# Patient Record
Sex: MALE | Race: WHITE | NOT HISPANIC OR LATINO | Employment: FULL TIME | URBAN - METROPOLITAN AREA
[De-identification: names, ages, dates, MRNs, and addresses within clinical notes are randomized per-mention and may not be internally consistent; named-entity substitution may affect disease eponyms.]

---

## 2023-10-29 ENCOUNTER — OFFICE VISIT (OUTPATIENT)
Dept: URGENT CARE | Facility: URGENT CARE | Age: 28
End: 2023-10-29
Payer: COMMERCIAL

## 2023-10-29 VITALS
DIASTOLIC BLOOD PRESSURE: 74 MMHG | HEART RATE: 72 BPM | TEMPERATURE: 98.1 F | SYSTOLIC BLOOD PRESSURE: 134 MMHG | OXYGEN SATURATION: 99 % | WEIGHT: 150 LBS

## 2023-10-29 DIAGNOSIS — J34.89 SINUS PAIN: Primary | ICD-10-CM

## 2023-10-29 DIAGNOSIS — K08.89 PAIN IN TOOTH: ICD-10-CM

## 2023-10-29 PROCEDURE — 99203 OFFICE O/P NEW LOW 30 MIN: CPT | Performed by: INTERNAL MEDICINE

## 2023-10-29 RX ORDER — FLUTICASONE PROPIONATE 50 MCG
1 SPRAY, SUSPENSION (ML) NASAL 2 TIMES DAILY
Qty: 16 G | Refills: 0 | Status: SHIPPED | OUTPATIENT
Start: 2023-10-29 | End: 2023-11-08

## 2023-10-29 NOTE — PROGRESS NOTES
ASSESSMENT AND PLAN:      ICD-10-CM    1. Sinus pain  J34.89 amoxicillin-clavulanate (AUGMENTIN) 875-125 MG tablet     fluticasone (FLONASE) 50 MCG/ACT nasal spray      2. Pain in tooth  K08.89 amoxicillin-clavulanate (AUGMENTIN) 875-125 MG tablet        Patient Instructions       Sinus infection & tooth infection    Ice  Heat.  Augmentin  antibiotics 2 x day for 10 days  Probiotics /yogurt    Flonase 2 x day for 10 days  No follow-ups on file.        Becky Ma MD  [facility]    Subjective     Mikel Camejo is a 28 year old who presents for Patient presents with:  Urgent Care  Facial Pain: C/O facial pain for 3 day    a new patient of Counts include 234 beds at the [facility].        Onset of symptoms was 3 day(s) ago.  Course of illness is worsening.    right Sinus & tooth pain started on plane flight  Not a lot of mucous    Seen by dentist few weeks ago & scheduled for fillings of cavities in same area  Not worse with eating  Worse with blowing nose    Flying again next Friday  Took first allergy shots - but day prior to pain    Treatment measures tried include: saline, sudafed, tylenol    Significant past medical history: history of allergies to dust mites, etc.    Had asymptomatic but sinus infection of mri in past.      Review of Systems        Objective    /74   Pulse 72   Temp 98.1  F (36.7  C) (Tympanic)   Wt 68 kg (150 lb)   SpO2 99%   Physical Exam  Vitals reviewed.   Constitutional:       Appearance: Normal appearance.   HENT:      Right Ear: Tympanic membrane normal.      Left Ear: Tympanic membrane normal.      Mouth/Throat:      Mouth: Mucous membranes are moist.      Pharynx: Oropharynx is clear.      Comments: No obvious cavities    Pain pinpoints at R mid jaw at base of sinuses  Neurological:      Mental Status: He is alert.

## 2023-10-29 NOTE — PATIENT INSTRUCTIONS
Sinus infection & tooth infection    Ice  Heat.  Augmentin  antibiotics 2 x day for 10 days  Probiotics /yogurt    Flonase 2 x day for 10 days

## 2023-12-17 ENCOUNTER — HEALTH MAINTENANCE LETTER (OUTPATIENT)
Age: 28
End: 2023-12-17

## 2024-02-10 ENCOUNTER — OFFICE VISIT (OUTPATIENT)
Dept: URGENT CARE | Facility: URGENT CARE | Age: 29
End: 2024-02-10
Payer: COMMERCIAL

## 2024-02-10 VITALS
DIASTOLIC BLOOD PRESSURE: 77 MMHG | OXYGEN SATURATION: 98 % | BODY MASS INDEX: 21.62 KG/M2 | SYSTOLIC BLOOD PRESSURE: 130 MMHG | TEMPERATURE: 98 F | HEART RATE: 73 BPM | WEIGHT: 146 LBS | RESPIRATION RATE: 16 BRPM | HEIGHT: 69 IN

## 2024-02-10 DIAGNOSIS — G47.00 INSOMNIA, UNSPECIFIED TYPE: ICD-10-CM

## 2024-02-10 DIAGNOSIS — F43.0 ACUTE REACTION TO STRESS: Primary | ICD-10-CM

## 2024-02-10 PROCEDURE — 99215 OFFICE O/P EST HI 40 MIN: CPT | Performed by: PHYSICIAN ASSISTANT

## 2024-02-10 RX ORDER — HYDROXYZINE HYDROCHLORIDE 25 MG/1
50 TABLET, FILM COATED ORAL EVERY 6 HOURS PRN
Qty: 30 TABLET | Refills: 0 | Status: SHIPPED | OUTPATIENT
Start: 2024-02-10 | End: 2024-02-24

## 2024-02-10 NOTE — PATIENT INSTRUCTIONS
Patient was educated on acute stress reaction to separation from spouse.  Conservative measures discussed including counseling and family/friends support. A mental health crisis hotline was provided. He is unsure if he will stay in [place] or follow through with his move to [place]. He is going to see therapist provided by work. Take medication as prescribed. Side effects discussed. He has tried melatonin for sleep with little success. Insomnia is secondary to sadness and anxiety due to relationship changes. See your primary care provider if symptoms worsen or do not improve in 7 days. Seek emergency care if you develop suicidal ideation.      [id number]    1. Text  Life  to [id number]*.  2. Wait for a trained crisis counselor to respond to your text.  3. Respond and start the conversation about what you are concerned about.  [id number] is a suicide prevention resource for residents in [place].  We can help you with relationship issues, general mental health, and suicide.  We are free, confidential, and here for you 24/7/365.    If I experience the following:  Thoughts of harm towards self or others    Please try the following and call list to get support:    (1) Utilize the support of family members, friends, and community support persons such as your , [facility] , therapist, or sponsor.  (2) Seek help before you act in an unhealthy or harmful manner.   (3) Call Crisis Connection at [phone]; or 838 if you need to do so.

## 2024-02-10 NOTE — PROGRESS NOTES
"URGENT CARE VISIT:    SUBJECTIVE:   Mikel Camejo is a 29 year old male who presents for sleeping difficulty for the last 10 days. He reports difficulty staying asleep. He has feeling anxious and sad since spousal separation. He decided to take job in [place] and is in the midst of moving. He is unsure if he still wants to move there as he likes his current job and has friends here in [place] that support him. He has a work counselor that he will see. He used to see a counselor when living in [facility] which helped a lot and he is interesting in establishing care with one. He has thoughts about why is alive but he denies any suicidal plans. He has PCP appointment in 3 days to discuss this issue in more detail.     PMH: History reviewed. No pertinent past medical history.  Allergies: Patient has no known allergies.  Medications:   Current Outpatient Medications   Medication Sig Dispense Refill    hydrOXYzine HCl (ATARAX) 25 MG tablet Take 2 tablets (50 mg) by mouth every 6 hours as needed for anxiety 30 tablet 0     Social History:   Social History     Tobacco Use    Smoking status: Never    Smokeless tobacco: Never   Substance Use Topics    Alcohol use: Not on file       ROS: ROS otherwise found to be negative except as noted above.     OBJECTIVE:  /77   Pulse 73   Temp 98  F (36.7  C) (Temporal)   Resp 16   Ht 1.753 m (5' 9\")   Wt 66.2 kg (146 lb)   SpO2 98%   BMI 21.56 kg/m    General: WDWN in NAD  Eyes: EOMI,  PERRL, conjunctiva clear  Neck: Supple, non-tender  Cardiac: RRR without murmurs, rubs, or gallops.  Respiratory: LCTAB without adventitious sounds. Non-labored breathing.  Musculoskeletal: No gross deformities noted, no erythema, FROM noted in all extremities  Neuro: Normal strength and tone, sensory exam grossly normal,  normal speech and mentation  Integumentary: No suspicious rashes or lesions.   Psych: depressed mood      ASSESSMENT:     ICD-10-CM    1. Acute reaction to stress  F43.0       2. " Insomnia, unspecified type  G47.00 hydrOXYzine HCl (ATARAX) 25 MG tablet           PLAN:  40 minutes spent by me on the date of the encounter doing chart review, review of outside records, review of test results, interpretation of tests, patient visit, and documentation   Patient Instructions   Patient was educated on acute stress reaction to separation from spouse.  Conservative measures discussed including counseling and family/friends support. A mental health crisis hotline was provided. He is unsure if he will stay in [place] or follow through with his move to [place]. He is going to see therapist provided by work. He has follow up appointment in 3 days and would like to try Atarax to help with symptoms in the meantime. Take medication as prescribed. Side effects discussed. He has tried melatonin for sleep with little success. Insomnia is secondary to sadness and anxiety due to relationship changes so treating primary condition will help with secondary sleep disturbance. Follow up with PCP as already scheduled. Seek emergency care if you develop suicidal ideation.      Book Buyback    1. Text  Life  to [phone]*.  2. Wait for a trained crisis counselor to respond to your text.  3. Respond and start the conversation about what you are concerned about.  [employer] is a suicide prevention resource for residents in [place].  We can help you with relationship issues, general mental health, and suicide.  We are free, confidential, and here for you 24/7/365.    If I experience the following:  Thoughts of harm towards self or others    Please try the following and call list to get support:    (1) Utilize the support of family members, friends, and community support persons such as your , county , therapist, or sponsor.  (2) Seek help before you act in an unhealthy or harmful manner.   (3) Call Crisis Connection at [phone]; or 118 if you need to do so.     Patient verbalized understanding and is agreeable to  plan. The patient was discharged ambulatory and in stable condition.    NABIL Brewer...................  2/10/2024   1:57 PM

## 2025-01-12 ENCOUNTER — HEALTH MAINTENANCE LETTER (OUTPATIENT)
Age: 30
End: 2025-01-12